# Patient Record
Sex: MALE | Race: WHITE | NOT HISPANIC OR LATINO | Employment: FULL TIME | URBAN - METROPOLITAN AREA
[De-identification: names, ages, dates, MRNs, and addresses within clinical notes are randomized per-mention and may not be internally consistent; named-entity substitution may affect disease eponyms.]

---

## 2017-01-10 ENCOUNTER — COMMUNICATION - HEALTHEAST (OUTPATIENT)
Dept: FAMILY MEDICINE | Facility: CLINIC | Age: 47
End: 2017-01-10

## 2017-01-10 ENCOUNTER — OFFICE VISIT - HEALTHEAST (OUTPATIENT)
Dept: FAMILY MEDICINE | Facility: CLINIC | Age: 47
End: 2017-01-10

## 2017-01-10 DIAGNOSIS — M25.519 SHOULDER PAIN: ICD-10-CM

## 2017-01-10 ASSESSMENT — MIFFLIN-ST. JEOR: SCORE: 1766.17

## 2017-11-09 ENCOUNTER — RECORDS - HEALTHEAST (OUTPATIENT)
Dept: ADMINISTRATIVE | Facility: OTHER | Age: 47
End: 2017-11-09

## 2017-11-09 ENCOUNTER — HOSPITAL ENCOUNTER (OUTPATIENT)
Dept: CT IMAGING | Facility: CLINIC | Age: 47
Discharge: HOME | End: 2017-11-09

## 2017-11-09 DIAGNOSIS — C62.90 TESTICULAR CANCER (H): ICD-10-CM

## 2018-11-23 ENCOUNTER — RECORDS - HEALTHEAST (OUTPATIENT)
Dept: ADMINISTRATIVE | Facility: OTHER | Age: 48
End: 2018-11-23

## 2021-05-30 VITALS — WEIGHT: 200.6 LBS | BODY MASS INDEX: 28.72 KG/M2 | HEIGHT: 70 IN

## 2021-06-05 ENCOUNTER — RECORDS - HEALTHEAST (OUTPATIENT)
Dept: SCHEDULING | Facility: CLINIC | Age: 51
End: 2021-06-05

## 2021-06-05 DIAGNOSIS — C62.90 MALIGNANT NEOPLASM OF OTHER AND UNSPECIFIED TESTIS: ICD-10-CM

## 2021-06-08 NOTE — PROGRESS NOTES
ASSESSMENT:  Shoulder pain [M25.519]    1. Shoulder pain  - predniSONE (DELTASONE) 10 MG tablet; 4 tabs a day for 3 days, 3 tabs a day for 3 days, 2 tabs a day for 3 days, 1 tab a day for 3 days, then stop.  Dispense: 30 tablet; Refill: 0    He seems to have an irritation in a nerve and most likely it probably is in nerve as it comes off of the spinal column.  He has lots of spasm and tight muscles on that left side over the area of the trapezius muscle and underneath the shoulder blade on that side.  I suspect that that is what is causing the irritation in the arm.  He does not show any evidence for any motor or strength issues.  He has one finger that is numb at this point but is able to move the finger other than the fact that it feels like it is a little swollen when he tries to move it.  There is no appreciable swelling at all in his arm even though he feels like the left elbow is warm it is not to exam today.  Again I suspect that this is probably an irritation to the nerve and I think he would benefit from prednisone.  He notes that he has prednisone at home because of having to take that after his chemotherapy and in regards to his testicular cancer.  We discussed using the prednisone he has at home I would like him to use 40 mg a day for 3 days and then 30 mg a day for 3 days then 20 mg a day for 3 days and then 10 mg a day for 3 days.  I am hoping that this will reduce the inflammation in that area and resolve some of this irritation and symptoms that he is having.  If he has additional concerns or questions will certainly let me know.  If he has any worsening of his symptoms he certainly should let us know as well.  He did call later in the day stating that he did not have prednisone at home and asking me to send a prescription to the pharmacy which was sent.  He denies that this is a work comp injury.      PLAN:  There are no Patient Instructions on file for this visit.    No orders of the defined types  "were placed in this encounter.    Medications Discontinued During This Encounter   Medication Reason     predniSONE (DELTASONE) 10 MG tablet Reorder       Return if symptoms worsen or fail to improve.    CHIEF COMPLAINT:  Chief Complaint   Patient presents with     Shoulder Pain     Check left shoulder/arm/hand pain, shooting pain worse with laying down and certain movements, no known injury DOI-12/21/16       HISTORY OF PRESENT ILLNESS:  Papa is a 46 y.o. male presenting to the clinic today with concerns of left shoulder pain. He is a patient of Dr. Marvin.    Shoulder pain: He started to experience pain in his left shoulder 3-4 weeks ago. He works for [employer], and injured his hip while at work, which caused back pain. He recalls lifting something heavy at work, and thinks this caused his left shoulder pain. He initially experienced a \"zing\" of pain and then it was gone. He did not experience more pain until a few days later when he experienced a sharp pain in his left elbow. He experiences pain throughout his left arm, and experiences numbness in his left index finger.  Initially he was having pain in his entire upper arm and down into his second and third finger on the left hand but over time the third finger has improved and now is just the second finger that is having numbness in.  He denies pain or numbness in any other finger of his left hand. He went to a chiropractor and has been having regular adjustments for the last 2 weeks. His chiropractor notices a lot of tension near his left shoulder blade.  It is thought that this was muscle spasms and that he had a bunch of knots in his back especially under his left shoulder blade.  He has noticed that he cannot lay on his left side, and this has interrupted his sleep. He has tried ice with some relief. During the day, his shoulder pain is improved with movement, but he experiences pain at night when he rests. He has good range of motion of his left arm and is " "not complaining of any problems with movement of the arm or any problems with his strength.. He currently describes his pain as  Feeling \"like an ice pick\" in his left elbow, and notes that his arm is warm to the touch. If he presses on his elbow, he is able to experience some relief.     He had testicular cancer in 2012- 2013. He has regular follow up appointments and has a CT scan scheduled. 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     REVIEW OF SYSTEMS:   Denies fever, chills, visual changes, fatigue, myalgias, nasal congestion, rhinorrhea, ear pain or discharge, sore throat, swollen glands, abdominal pain, nausea, vomiting, diarrhea, constipation, cough, shortness of breath, chest pain, weight change, change in bowel habits, melena, rectal bleeding, dysuria, frequency, urgency, hematuria, polyuria, polydipsia, polyphagia, swelling or erythema, edema, rash, weakness, paresthesias,  or mood changes.  Remainder of review of systems was negative.    PFSH:  Reviewed, as below.     Patient Active Problem List   Diagnosis     Testicular Seminoma     Lymphadenopathy       No Known Allergies    History reviewed. No pertinent past medical history.    Past Surgical History   Procedure Laterality Date     Inguinal hernia repair Bilateral 01/23/2012     Dr. Bililngs       History   Smoking Status     Never Smoker   Smokeless Tobacco     Not on file       TOBACCO USE:  History   Smoking Status     Never Smoker   Smokeless Tobacco     Not on file       VITALS:  Vitals:    01/10/17 1037   BP: 110/68   Patient Site: Right Arm   Patient Position: Sitting   Cuff Size: Adult Regular   Pulse: 82   Resp: 14   Temp: 97.8  F (36.6  C)   TempSrc: Oral   Weight: 200 lb 9.6 oz (91 kg)   Height: 5' 10\" (1.778 m)     Wt Readings from Last 3 Encounters:   01/10/17 200 lb 9.6 oz (91 kg)   12/03/15 204 lb (92.5 kg)   11/30/15 203 lb (92.1 kg)     Body mass index is 28.78 kg/(m^2).    PHYSICAL EXAM:  GENERAL " APPEARANCE: A&A, NAD, well hydrated, well nourished  SKIN:  Normal skin turgor, no lesions/rashes   CV: RRR, no M/G/R   LUNGS: CTAB  EXTREMITY: no edema. No tenderness to palpation of the left arm.  There is no warmth to the touch over his elbow although he states that it feels warm all the time.  There is absolutely no swelling of his elbow or his arm.  Full range of motion of his arm is appreciated.  Normal strength in that left arm is appreciated.  Spasms noted over trapezius muscle at the base of the neck. Full range of motion of the left shoulder. Strength is 5/5. Normal grasp strength. Full range of motion of the neck.   NEURO: no gross deficits.  Normal sensation in entire hand, other than left index finger, which was markedly decreased. He was able to feel me touching, but distinctly less than all of his other fingers.   PSYCHIATRIC;  Mood appropriate, memory intact      ADDITIONAL HISTORY SUMMARIZED (2): Further history obtained from Dr. Leslie regarding shoulder pain and index finger numbness.  DECISION TO OBTAIN EXTRA INFORMATION (1): Requested second opinion from Dr. Wells.   RADIOLOGY TESTS (1): None.  LABS (1): None.  MEDICINE TESTS (1): None.  INDEPENDENT REVIEW (2 each): None.     The visit lasted a total of 13 minutes face to face with the patient. Over 50% of the time was spent counseling and educating the patient about shoulder and left arm pain and numbness    I, Kelsie Aguirre, am scribing for and in the presence of, Dr. Santamaria.    I, Dr. Santamaria, personally performed the services described in this documentation, as scribed by Kelsie Aguirre in my presence, and it is both accurate and complete.    MEDICATIONS:  Current Outpatient Prescriptions   Medication Sig Dispense Refill     predniSONE (DELTASONE) 10 MG tablet 4 tabs a day for 3 days, 3 tabs a day for 3 days, 2 tabs a day for 3 days, 1 tab a day for 3 days, then stop. 30 tablet 0     No current facility-administered  medications for this visit.        Total data points: 3

## 2022-01-26 ENCOUNTER — OFFICE VISIT (OUTPATIENT)
Dept: FAMILY MEDICINE | Facility: CLINIC | Age: 52
End: 2022-01-26
Payer: COMMERCIAL

## 2022-01-26 VITALS
TEMPERATURE: 98.4 F | OXYGEN SATURATION: 97 % | WEIGHT: 208 LBS | HEIGHT: 70 IN | SYSTOLIC BLOOD PRESSURE: 138 MMHG | HEART RATE: 76 BPM | BODY MASS INDEX: 29.78 KG/M2 | DIASTOLIC BLOOD PRESSURE: 98 MMHG

## 2022-01-26 DIAGNOSIS — K21.9 GASTROESOPHAGEAL REFLUX DISEASE WITHOUT ESOPHAGITIS: ICD-10-CM

## 2022-01-26 DIAGNOSIS — Z13.220 SCREENING FOR HYPERLIPIDEMIA: ICD-10-CM

## 2022-01-26 DIAGNOSIS — Z00.00 ANNUAL PHYSICAL EXAM: Primary | ICD-10-CM

## 2022-01-26 DIAGNOSIS — I10 BENIGN ESSENTIAL HYPERTENSION: ICD-10-CM

## 2022-01-26 DIAGNOSIS — Z13.1 SCREENING FOR DIABETES MELLITUS: ICD-10-CM

## 2022-01-26 DIAGNOSIS — Z12.5 SCREENING FOR PROSTATE CANCER: ICD-10-CM

## 2022-01-26 PROBLEM — C62.90: Status: ACTIVE | Noted: 2022-01-26

## 2022-01-26 PROBLEM — Z85.47 HISTORY OF TESTICULAR CANCER: Status: ACTIVE | Noted: 2022-01-26

## 2022-01-26 LAB
ANION GAP SERPL CALCULATED.3IONS-SCNC: 12 MMOL/L (ref 5–18)
BUN SERPL-MCNC: 10 MG/DL (ref 8–22)
CALCIUM SERPL-MCNC: 9.4 MG/DL (ref 8.5–10.5)
CHLORIDE BLD-SCNC: 101 MMOL/L (ref 98–107)
CHOLEST SERPL-MCNC: 199 MG/DL
CO2 SERPL-SCNC: 27 MMOL/L (ref 22–31)
CREAT SERPL-MCNC: 1 MG/DL (ref 0.7–1.3)
FASTING STATUS PATIENT QL REPORTED: YES
GFR SERPL CREATININE-BSD FRML MDRD: 90 ML/MIN/1.73M2
GLUCOSE BLD-MCNC: 96 MG/DL (ref 70–125)
HDLC SERPL-MCNC: 63 MG/DL
LDLC SERPL CALC-MCNC: 118 MG/DL
POTASSIUM BLD-SCNC: 4.2 MMOL/L (ref 3.5–5)
PSA SERPL-MCNC: 0.38 UG/L (ref 0–3.5)
SODIUM SERPL-SCNC: 140 MMOL/L (ref 136–145)
TRIGL SERPL-MCNC: 89 MG/DL

## 2022-01-26 PROCEDURE — 80061 LIPID PANEL: CPT | Performed by: NURSE PRACTITIONER

## 2022-01-26 PROCEDURE — 99386 PREV VISIT NEW AGE 40-64: CPT | Performed by: NURSE PRACTITIONER

## 2022-01-26 PROCEDURE — G0103 PSA SCREENING: HCPCS | Performed by: NURSE PRACTITIONER

## 2022-01-26 PROCEDURE — 80048 BASIC METABOLIC PNL TOTAL CA: CPT | Performed by: NURSE PRACTITIONER

## 2022-01-26 PROCEDURE — 36415 COLL VENOUS BLD VENIPUNCTURE: CPT | Performed by: NURSE PRACTITIONER

## 2022-01-26 ASSESSMENT — MIFFLIN-ST. JEOR: SCORE: 1794.73

## 2022-01-26 NOTE — PROGRESS NOTES
SUBJECTIVE:   CC: Papa Robertson is an 51 year old male who presents for preventative health visit.     Doing well.  Struggles with a persistent sensation like he has to clear his throat.  No dysphagia.  Occasional nausea with upset stomach.  Occasional heartburn. No purulent sputum.    Blood pressure is elevated.  Has been elevated at home.  Asymptomatic.  He has plans to start exercising again    Patient has been advised of split billing requirements and indicates understanding: Yes  Healthy Habits:     Getting at least 3 servings of Calcium per day:  NO    Bi-annual eye exam:  NO    Dental care twice a year:  Yes    Sleep apnea or symptoms of sleep apnea:  Excessive snoring    Diet:  Regular (no restrictions)    Frequency of exercise:  None    Taking medications regularly:  Yes    Medication side effects:  Not applicable    PHQ-2 Total Score: 0    Additional concerns today:  No    Today's PHQ-2 Score:   PHQ-2 ( 1999 Pf[employer]) 1/25/2022   Q1: Little interest or pleasure in doing things 0   Q2: Feeling down, depressed or hopeless 0   PHQ-2 Score 0   Q1: Little interest or pleasure in doing things Not at all   Q2: Feeling down, depressed or hopeless Not at all   PHQ-2 Score 0       Abuse: Current or Past(Physical, Sexual or Emotional)- No  Do you feel safe in your environment? Yes    Have you ever done Advance Care Planning? (For example, a Health Directive, POLST, or a discussion with a medical provider or your loved ones about your wishes): Yes, advance care planning is on file.    Social History     Tobacco Use     Smoking status: Never Smoker     Smokeless tobacco: Never Used   Substance Use Topics     Alcohol use: Yes     Comment: rarely     If you drink alcohol do you typically have >3 drinks per day or >7 drinks per week? No    Alcohol Use 1/25/2022   Prescreen: >3 drinks/day or >7 drinks/week? No       Last PSA:   Prostate Specific Antigen Screen   Date Value Ref Range Status   01/20/2012 0.7 <2.6 ng/mL Final     " Comment:     Method is Abbott Prostate-Specific Antigen (PSA)            Standard-WHO 1st International (90:10) as of 09/26/05         Reviewed orders with patient. Reviewed health maintenance and updated orders accordingly - Yes  Lab work is in process    Reviewed and updated as needed this visit by clinical staff  Tobacco  Allergies  Meds             Reviewed and updated as needed this visit by Provider               No past medical history on file.   Past Surgical History:   Procedure Laterality Date     AS LAP,ORCHIECTOMY Left      OTHER SURGICAL HISTORY Bilateral 01/23/2012    inguinal hernia repairDrPrakash Billings       Review of Systems  CONSTITUTIONAL: NEGATIVE for fever, chills, change in weight  INTEGUMENTARY/SKIN: NEGATIVE for worrisome rashes, moles or lesions  EYES: NEGATIVE for vision changes or irritation  ENT: NEGATIVE for ear, mouth and throat problems  RESP: NEGATIVE for significant cough or SOB  CV: NEGATIVE for chest pain, palpitations or peripheral edema  GI: NEGATIVE for nausea, abdominal pain, heartburn, or change in bowel habits   male: negative for dysuria, hematuria, decreased urinary stream, erectile dysfunction, urethral discharge  MUSCULOSKELETAL: NEGATIVE for significant arthralgias or myalgia  NEURO: NEGATIVE for weakness, dizziness or paresthesias  PSYCHIATRIC: NEGATIVE for changes in mood or affect    OBJECTIVE:   BP (!) 138/98   Pulse 76   Temp 98.4  F (36.9  C)   Ht 1.778 m (5' 10\")   Wt 94.3 kg (208 lb)   SpO2 97%   BMI 29.84 kg/m      Physical Exam  GENERAL: healthy, alert and no distress  EYES: Eyes grossly normal to inspection, PERRL and conjunctivae and sclerae normal  HENT: ear canals and TM's normal, nose and mouth without ulcers or lesions  NECK: no adenopathy, no asymmetry, masses, or scars and thyroid normal to palpation  RESP: lungs clear to auscultation - no rales, rhonchi or wheezes  CV: regular rate and rhythm, normal S1 S2, no S3 or S4, no murmur, click or " "rub, no peripheral edema and peripheral pulses strong  ABDOMEN: soft, nontender, no hepatosplenomegaly, no masses and bowel sounds normal  MS: no gross musculoskeletal defects noted, no edema  SKIN: no suspicious lesions or rashes  NEURO: Normal strength and tone, mentation intact and speech normal  PSYCH: mentation appears normal, affect normal/bright    Diagnostic Test Results:  Labs reviewed in Epic    ASSESSMENT/PLAN:       ICD-10-CM    1. Annual physical exam  Z00.00    2. Screening for hyperlipidemia  Z13.220 Lipid panel reflex to direct LDL Fasting     Lipid panel reflex to direct LDL Fasting   3. Screening for diabetes mellitus  Z13.1 CANCELED: Glucose   4. Screening for prostate cancer  Z12.5 PSA, screen     PSA, screen   5. Gastroesophageal reflux disease without esophagitis  K21.9 omeprazole (PRILOSEC) 20 MG DR capsule   6. Benign essential hypertension  I10 Basic metabolic panel     Basic metabolic panel     Screening labs ordered.  Patient has had elevated blood pressure for a little while now.  Discussed treatment options.  He would like to work on lifestyle first.  Reviewed-diet and exercise.  Suspect throat clearing may be reflux symptoms.  Trial of omeprazole.  Reviewed healthcare directives.  Reviewed vaccinations    Patient has been advised of split billing requirements and indicates understanding: Yes    COUNSELING:   Reviewed preventive health counseling, as reflected in patient instructions    Estimated body mass index is 29.84 kg/m  as calculated from the following:    Height as of this encounter: 1.778 m (5' 10\").    Weight as of this encounter: 94.3 kg (208 lb).     Weight management plan: Discussed healthy diet and exercise guidelines    He reports that he has never smoked. He has never used smokeless tobacco.      Counseling Resources:  ATP IV Guidelines  Pooled Cohorts Equation Calculator  FRAX Risk Assessment  ICSI Preventive Guidelines  Dietary Guidelines for Americans, 2010  USDA's " MyPlate  ASA Prophylaxis  Lung CA Screening    Abdi Dennis NP  [facility]

## 2022-01-26 NOTE — PATIENT INSTRUCTIONS
"Let's try omeprazole to see if it helps with the throat clearing.    Let me know via message in a month if symptoms persist and we can look into meeting with ENT.    Screening labs done today.    You've got 6 months to get the blood pressure under control. Consider the DASH diet and getting regular vigorous exercise.  If blood pressure is still elevated in 6 months, then we should consider starting medication    That shingles vaccine we talked about is called \"Shingrix\". Check with your insurance to see if they cover it. If they do and you're interested in getting it, let me know and we can have you come in for just the shot without having to see me.        Preventive Health Recommendations  Male Ages 50 - 64    Yearly exam:             See your health care provider every year in order to  o   Review health changes.   o   Discuss preventive care.    o   Review your medicines if your doctor has prescribed any.     Have a cholesterol test every 5 years, or more frequently if you are at risk for high cholesterol/heart disease.     Have a diabetes test (fasting glucose) every three years. If you are at risk for diabetes, you should have this test more often.     Have a colonoscopy at age 50, or have a yearly FIT test (stool test). These exams will check for colon cancer.      Talk with your health care provider about whether or not a prostate cancer screening test (PSA) is right for you.    You should be tested each year for STDs (sexually transmitted diseases), if you re at risk.     Shots: Get a flu shot each year. Get a tetanus shot every 10 years.     Nutrition:    Eat at least 5 servings of fruits and vegetables daily.     Eat whole-grain bread, whole-wheat pasta and brown rice instead of white grains and rice.     Get adequate Calcium and Vitamin D.     Lifestyle    Exercise for at least 150 minutes a week (30 minutes a day, 5 days a week). This will help you control your weight and prevent disease.     Limit " alcohol to one drink per day.     No smoking.     Wear sunscreen to prevent skin cancer.     See your dentist every six months for an exam and cleaning.     See your eye doctor every 1 to 2 years.        Dietary Approaches to Stop Hypertension (The DASH Diet)   What is hypertension?   Hypertension is the term for blood pressure that is consistently higher than normal. Blood pressure is the force of blood against artery walls. Blood pressure can be unhealthy if it is above 120/80. The higher your blood pressure, the greater the health risk.     High blood pressure can be controlled if you take these steps:   Maintain a healthy weight.   Be physically active.   Follow a healthy eating plan, which includes foods lower in salt and sodium.   If you drink alcoholic beverages, do so in moderation.   As noted in this list, diet affects high blood pressure. Following the DASH diet and reducing the amount of sodium in your diet will help lower your blood pressure. It will also help prevent high blood pressure.     What is the DASH diet?   Dietary Approaches to Stop Hypertension (DASH) is a diet that is low in saturated fat, cholesterol, and total fat. It emphasizes fruits, vegetables, and low-fat dairy foods. The DASH diet also includes whole-grain products, fish, poultry, and nuts. It encourages fewer servings of red meat, sweets, and sugar-containing beverages. It is rich in magnesium, potassium, and calcium, as well as protein and fiber.     How do I get started on the DASH diet?   The DASH diet requires no special foods and has no hard-to-follow recipes. Start by seeing how DASH compares with your current eating habits.  The DASH eating plan shown is based on 2,000 calories a day. Your health care provider or a dietitian can help you determine how many calories a day you need. Most adults need somewhere between 1600 and 2800 calories a day. Serving sizes will vary between 1/2 cup and 1 1/4 cups.     Check the product's  nutrition label to determine serving sizes of particular products.    Food Group   Number of Servings   Examples of Serving Size    Grains and grain products   7 to 8   1 slice of bread    1 cup ready-to-eat cold cereal    1/2 cup cooked rice, pasta, or   cereal    Vegetables   4 to 5   1 cup raw leafy vegetable    1/2 cup cooked vegetable    6 oz. vegetable juice    Fruits   4 to 5   1 medium fruit       1/4 cup dried fruit    1/2 cup fresh, frozen, or canned fruit    6 oz fruit juice    Low-fat or fat-free dairy foods   2 to 3   8 oz milk    1 cup yogurt    1 1/2 oz cheese    Lean meats, poultry, or fish   2 or fewer   3 oz cooked lean meat, skinless poultry, or fish    Nuts, seeds, and dry beans   4 to 5 per week   1/3 cup or 1 1/2 oz nuts    1 tablespoon or 1/2 oz seeds    1/2 cup cooked dry beans     Fats and oils   2 to 3   1 teaspoon soft margarine    1 tablespoon low-fat mayonnaise    2 tablespoons light salad dressing    1 teaspoon vegetable oil   Sweets   5 per week   1 tablespoon sugar              8 oz lemonade    1 tablespoon jelly or jam     1/2 oz jelly beans     Make changes gradually. Here are some suggestions that might help:     If you now eat 1 or 2 servings of vegetables a day, add a serving at lunch and another at dinner.   If you don't eat fruit now or have only juice at breakfast, add a serving to your meals or have it as a snack.   Drink milk or water with lunch or dinner instead of soda, sugar-sweetened tea, or alcohol. Choose low-fat (1%) or fat-free (skim) dairy products to reduce how much saturated fat, total fat, cholesterol, and calories you eat. If you have trouble digesting dairy products, try taking lactase enzyme pills or drops (available at drugstores and groceries) with the dairy foods. Or buy lactose-free milk or milk with lactase enzyme added to it.   Read food labels on margarines and salad dressings to choose products lowest in fat.   If you now eat large portions of meat,  cut back gradually--by a half or a third at each meal. Limit meat to 6 ounces a day (2 servings). Three to four ounces is about the size of a deck of cards.   Have 2 or more vegetarian-style (meatless) meals each week. Increase servings of vegetables, rice, pasta, and beans in all meals. Try casseroles and pasta, and stir-gaytan dishes, which have less meat and more vegetables, grains, and beans.   Use fruits canned in their own juice. Fresh fruits require little or no preparation. Dried fruits are a good choice to carry with you or to have ready in the car.   Try these snacks ideas: unsalted pretzels or nuts mixed with raisins, stan crackers, low-fat and fat-free yogurt and frozen yogurt, popcorn with no salt or butter added, and raw vegetables.   Choose whole grain foods to get more nutrients, including minerals and fiber. For example, choose whole-wheat bread or whole-grain cereals.   Use fresh, frozen, or no-salt-added canned vegetables.     Remember to also reduce the salt and sodium in your diet. Try to have no more than 2000 milligrams (mg) of sodium per day, with a goal of further reducing the sodium to 1500 mg per day. Three important ways to reduce sodium are:     Use reduced-sodium or no-salt-added food products.   Use less salt when you prepare foods and do not add salt to your food at the table.   Read fool labels. Aim for foods that are less than 5 percent of the daily value of sodium.     The DASH eating plan was not designed for weight loss. But it contains many lower calorie foods, such as fruits and vegetables. You can make it lower in calories by replacing higher calorie foods with more fruits and vegetables. Some ideas to increase fruits and vegetables and decrease calories include:     Eat a medium apple instead of four shortbread cookies. You'll save 80 calories.   Eat 1/4 cup of dried apricots instead of a 2-ounce bag of pork rinds. You'll save 230 calories.   Have a hamburger that's 3 ounces  instead of 6 ounces. Add a 1/2 cup serving of carrots and a 1/2 cup serving of spinach. You'll save more than 200 calories.   Instead of 5 ounces of chicken, have a stir gaytan with 2 ounces of chicken and 1 and 1/2 cups of raw vegetables. Use a small amount of vegetable oil. You'll save 50 calories.   Have a 1/2 cup serving of low-fat frozen yogurt instead of a 1 and 1/2 ounce milk chocolate bar. You'll save about 110 calories.   Use low-fat or fat-free condiments, such as fat free salad dressings.   Eat smaller portions--cut back gradually.   Use food labels to compare fat content in packaged foods. Items marked low-fat or fat-free may be lower in fat without being lower in calories than their regular versions.   Limit foods with lots of added sugar, such as pies, flavored yogurts, candy bars, ice cream, sherbet, regular soft drinks, and fruit drinks.   Drink water or club soda instead of cola or other soda drinks.     Based on National Institutes of Health Guidelines. Published by [employer].   Copyright   2004 [employer] and/or one of its subsidiaries. All Rights Reserved.

## 2022-02-13 ENCOUNTER — HEALTH MAINTENANCE LETTER (OUTPATIENT)
Age: 52
End: 2022-02-13

## 2022-06-23 ENCOUNTER — ANCILLARY PROCEDURE (OUTPATIENT)
Dept: GENERAL RADIOLOGY | Facility: CLINIC | Age: 52
End: 2022-06-23
Attending: NURSE PRACTITIONER
Payer: COMMERCIAL

## 2022-06-23 ENCOUNTER — OFFICE VISIT (OUTPATIENT)
Dept: FAMILY MEDICINE | Facility: CLINIC | Age: 52
End: 2022-06-23

## 2022-06-23 VITALS
SYSTOLIC BLOOD PRESSURE: 130 MMHG | WEIGHT: 214.2 LBS | DIASTOLIC BLOOD PRESSURE: 92 MMHG | HEART RATE: 79 BPM | OXYGEN SATURATION: 96 % | BODY MASS INDEX: 30.73 KG/M2

## 2022-06-23 DIAGNOSIS — R07.0 THROAT DISCOMFORT: Primary | ICD-10-CM

## 2022-06-23 DIAGNOSIS — R05.3 CHRONIC COUGH: ICD-10-CM

## 2022-06-23 DIAGNOSIS — Z85.47 HISTORY OF TESTICULAR CANCER: ICD-10-CM

## 2022-06-23 PROCEDURE — 71046 X-RAY EXAM CHEST 2 VIEWS: CPT | Mod: TC | Performed by: RADIOLOGY

## 2022-06-23 PROCEDURE — 99213 OFFICE O/P EST LOW 20 MIN: CPT | Performed by: NURSE PRACTITIONER

## 2022-06-23 ASSESSMENT — PAIN SCALES - GENERAL: PAINLEVEL: NO PAIN (0)

## 2022-06-23 NOTE — PROGRESS NOTES
Assessment & Plan     ICD-10-CM    1. Throat discomfort  R07.0 Adult ENT  Referral   2. Chronic cough  R05.3 XR Chest 2 Views     CANCELED: XR Chest 2 Views   3. History of testicular cancer  Z85.47 Adult Cardiology Eval  Referral     Given persistent cough, chest x-ray ordered.  Negative.  He has tried antihistamines and nasal sprays without any benefit.  ENT referral placed for constant throat sensation for further evaluation.  Requested cardiology referral placed as well.    Subjective     HPI     Cough  Omeprazole didn't help.  He has a constant clearing of his throat with postnasal drip and feeling of issues in his sinuses.  Try gets that no hemoptysis.  No chest pain.  He reports that given his history of testicular cancer treatment he was told to establish care with a cardiologist several years afterwards which he would like to do now.    Review of Systems - negative except for what's listed in the HPI      Objective    BP (!) 130/92   Pulse 79   Wt 97.2 kg (214 lb 3.2 oz)   SpO2 96%   BMI 30.73 kg/m    Physical Exam   General appearance - alert, well appearing, and in no distress  Mental status - alert, oriented to person, place, and time  Eyes -PERRLA  Ears - bilateral TM's and external ear canals normal  Nose -patent.  Mild amounts of discharge.  No tenderness along the maxillary sinuses.  Mouth - mucous membranes moist. No oral lesions.  Neck - no significant adenopathy  Lymphatics - no palpable lymphadenopathy  Chest - clear to auscultation, no wheezes, rales or rhonchi, symmetric air entry  Heart - normal rate and regular rhythm, S1 and S2 normal, no murmurs noted  Extremities - no peripheral edema  Skin - normal coloration and turgor.    Abdi Dennis, CNP    This note has been dictated using voice recognition software. Any grammatical or context distortions are unintentional and inherent to the software.

## 2022-06-23 NOTE — PATIENT INSTRUCTIONS
Chest x-ray today    ENT referral placed.  Contact information provided.    Same with the cardiology referral that you requested.  I suspect they will probably want to do an echocardiogram on you, but in order to avoid unnecessary testing I will let them make that call.

## 2022-07-28 ENCOUNTER — OFFICE VISIT (OUTPATIENT)
Dept: OTOLARYNGOLOGY | Facility: CLINIC | Age: 52
End: 2022-07-28
Attending: NURSE PRACTITIONER
Payer: COMMERCIAL

## 2022-07-28 DIAGNOSIS — R04.0 EPISTAXIS: ICD-10-CM

## 2022-07-28 DIAGNOSIS — K21.9 LARYNGOPHARYNGEAL REFLUX (LPR): ICD-10-CM

## 2022-07-28 DIAGNOSIS — R09.A2 GLOBUS SENSATION: Primary | ICD-10-CM

## 2022-07-28 PROCEDURE — 30901 CONTROL OF NOSEBLEED: CPT | Mod: RT | Performed by: OTOLARYNGOLOGY

## 2022-07-28 PROCEDURE — 31575 DIAGNOSTIC LARYNGOSCOPY: CPT | Performed by: OTOLARYNGOLOGY

## 2022-07-28 PROCEDURE — 99243 OFF/OP CNSLTJ NEW/EST LOW 30: CPT | Mod: 25 | Performed by: OTOLARYNGOLOGY

## 2022-07-28 NOTE — PROGRESS NOTES
HPI: This patient is a 51yo M who presents for evaluation of the throat at the request of Abdi Dennis NP. There has been a globus sensation for a few years, accompanied with throat clearing and occasional dry cough. Denies fevers, otalgia, weight loss, odynophagia, dysphagia, hemoptysis, and shortness of breath. Does not complain of sour taste, heartburn, or burping. Did try taking reflux medication for about 6 weeks without improvement. Also has epistaxis issues rather often.    Past medical history, surgical history, social history, family history, medications, and allergies have been reviewed with the patient and are documented above.    Review of Systems: a 10-system review was performed. Pertinent positives are noted in the HPI and on a separate scanned document in the chart.    PHYSICAL EXAMINATION:  GEN: no acute distress, normocephalic  EYES: extraocular movements are intact, pupils are equal and round. Sclera clear.   EARS: auricles are normally formed. The external auditory canals are clear with minimal to no cerumen. Tympanic membranes are intact bilaterally with no signs of infection, effusion, retractions, or perforations.  NOSE: anterior nares are patent. There are no masses or lesions. The septum is non-obstructing but deviated to the left. Anterior septum is dry bilaterally. Right side cauterized in two spots.  OC/OP: clear, dentition is in good repair. The tongue and palate are fully mobile and symmetric. No masses or lesions. Cobblestoning of the posterior pharyngeal wall.  HP/L (scope): nasopharynx, base of tongue, vallecula, epiglottis, and pyriform sinuses are clear. The bilateral vocal folds are mobile and without lesion. There is minimal interarytenoid pachydermia and some hyperemia of the laryngeal surface of the epiglottis c/w LPR.  NECK: soft and supple. No lymphadenopathy or masses. Airway is midline.  NEURO: CN VII and XII symmetric. alert and oriented. No spontaneous nystagmus. Gait is  normal.  PULM: breathing comfortably on room air, normal chest expansion with respiration  CARDS: no cyanosis or clubbing, normal carotid pulses    FLEXIBLE LARYNGOSCOPY: Scope inserted bilaterally to examine nasal tissue, nasopharynx, posterior oropharynx, hypopharynx, and larynx. See exam notes for exam finding details. Patient tolerated the procedure well.    PROCEDURE: the nasal tissue was prepped with topical neosynephrine and lidocaine spray. Silver nitrate was used to perform the nasal cautery without difficulty. The patient tolerated the procedure well.    MEDICAL DECISION-MAKING: This patient is a 51yo M with chronic laryngitis/globus sensation most likely from acid reflux in absence of other findings. Will refer to GI for further management moving forward. Also has epistaxis from the anterior septum. The area in question was cauterized today in clinic without difficulty. Advised on nasal saline use. RTC PRN.

## 2022-07-28 NOTE — LETTER
7/28/2022         RE: Papa Robertson  [address] [facility] [address]        Dear Colleague,    Thank you for referring your patient, Papa Robertson, to the [facility]. Please see a copy of my visit note below.    HPI: This patient is a 53yo M who presents for evaluation of the throat at the request of Abdi Dennis NP. There has been a globus sensation for a few years, accompanied with throat clearing and occasional dry cough. Denies fevers, otalgia, weight loss, odynophagia, dysphagia, hemoptysis, and shortness of breath. Does not complain of sour taste, heartburn, or burping. Did try taking reflux medication for about 6 weeks without improvement. Also has epistaxis issues rather often.    Past medical history, surgical history, social history, family history, medications, and allergies have been reviewed with the patient and are documented above.    Review of Systems: a 10-system review was performed. Pertinent positives are noted in the HPI and on a separate scanned document in the chart.    PHYSICAL EXAMINATION:  GEN: no acute distress, normocephalic  EYES: extraocular movements are intact, pupils are equal and round. Sclera clear.   EARS: auricles are normally formed. The external auditory canals are clear with minimal to no cerumen. Tympanic membranes are intact bilaterally with no signs of infection, effusion, retractions, or perforations.  NOSE: anterior nares are patent. There are no masses or lesions. The septum is non-obstructing but deviated to the left. Anterior septum is dry bilaterally. Right side cauterized in two spots.  OC/OP: clear, dentition is in good repair. The tongue and palate are fully mobile and symmetric. No masses or lesions. Cobblestoning of the posterior pharyngeal wall.  HP/L (scope): nasopharynx, base of tongue, vallecula, epiglottis, and pyriform sinuses are clear. The bilateral vocal folds are mobile and without lesion. There is minimal  interarytenoid pachydermia and some hyperemia of the laryngeal surface of the epiglottis c/w LPR.  NECK: soft and supple. No lymphadenopathy or masses. Airway is midline.  NEURO: CN VII and XII symmetric. alert and oriented. No spontaneous nystagmus. Gait is normal.  PULM: breathing comfortably on room air, normal chest expansion with respiration  CARDS: no cyanosis or clubbing, normal carotid pulses    FLEXIBLE LARYNGOSCOPY: Scope inserted bilaterally to examine nasal tissue, nasopharynx, posterior oropharynx, hypopharynx, and larynx. See exam notes for exam finding details. Patient tolerated the procedure well.    PROCEDURE: the nasal tissue was prepped with topical neosynephrine and lidocaine spray. Silver nitrate was used to perform the nasal cautery without difficulty. The patient tolerated the procedure well.    MEDICAL DECISION-MAKING: This patient is a 53yo M with chronic laryngitis/globus sensation most likely from acid reflux in absence of other findings. Will refer to GI for further management moving forward. Also has epistaxis from the anterior septum. The area in question was cauterized today in clinic without difficulty. Advised on nasal saline use. RTC PRN.        Again, thank you for allowing me to participate in the care of your patient.        Sincerely,        Kathy Deras MD

## 2022-08-02 ENCOUNTER — TELEPHONE (OUTPATIENT)
Dept: GASTROENTEROLOGY | Facility: CLINIC | Age: 52
End: 2022-08-02

## 2022-08-02 NOTE — TELEPHONE ENCOUNTER
[facility] Call Center    Phone Message    May a detailed message be left on voicemail: Yes    Reason for Call: Other: Patient is currently scheduled on 2/62022, as a patient New GI Urgent. This is outside the expected timeline for this schedule. Paitent has been added to the waitlist.      Action Taken: Message routed to:  Other: GI REFERRAL TRIAGE POOL     Travel Screening: Not Applicable

## 2022-08-04 NOTE — TELEPHONE ENCOUNTER
LM to return call. Patient has appointment with Dr. Jett in Gastro on 2/6/2023 at 10:00 AM. Please offer and assist sooner appointment if patient would like to be seen sooner. Okay to use return appointment if patient calls back in the next 30 days from today.   Lina Burton cma.....8/4/2022 at 1:24 PM

## 2022-08-10 NOTE — TELEPHONE ENCOUNTER
LM to return call. Patient has appointment with Dr. Jett in Gastro on 2/6/2023 at 10:00 AM. Please offer and assist sooner appointment if patient would like to be seen sooner. Okay to use return appointment if patient calls back in the next 30 days from today.   Lina Burton cma.....8/10/2022 at 9:21 AM

## 2022-08-15 ENCOUNTER — OFFICE VISIT (OUTPATIENT)
Dept: GASTROENTEROLOGY | Facility: CLINIC | Age: 52
End: 2022-08-15
Attending: OTOLARYNGOLOGY
Payer: COMMERCIAL

## 2022-08-15 VITALS
HEIGHT: 70 IN | DIASTOLIC BLOOD PRESSURE: 88 MMHG | BODY MASS INDEX: 30.91 KG/M2 | WEIGHT: 215.9 LBS | SYSTOLIC BLOOD PRESSURE: 136 MMHG

## 2022-08-15 DIAGNOSIS — R09.89 CHRONIC THROAT CLEARING: Primary | ICD-10-CM

## 2022-08-15 DIAGNOSIS — K21.9 LARYNGOPHARYNGEAL REFLUX (LPR): ICD-10-CM

## 2022-08-15 PROCEDURE — 99213 OFFICE O/P EST LOW 20 MIN: CPT | Performed by: INTERNAL MEDICINE

## 2022-08-15 ASSESSMENT — PAIN SCALES - GENERAL: PAINLEVEL: NO PAIN (0)

## 2022-08-15 NOTE — PATIENT INSTRUCTIONS
As we had discussed    1.  We will arrange upper endoscopy with Bravo pH study off medications to measure acid levels in the lower esophagus    2.  Discussed.  Other treatments if needed in the future defer to ears nose and throat team.    As needed return

## 2022-08-15 NOTE — LETTER
8/15/2022         RE: Papa Robertson  [address] [address]        Dear Colleague,    Thank you for referring your patient, Papa Robertson, to the [facility]. Please see a copy of my visit note below.    Gastroenterology    52-year-old male to review throat clearing for roughly 8 to 12 months.  He was placed on omeprazole for 6 to 7 weeks without clear benefit.  He has had a sensation perhaps a object is in the region of his throat there was previous concern of a cough but that has resolved.  No symptoms of classic heartburn.  Prior nasal spray without benefit.    ENT review July 28 hyperemia of the epiglottis and inferior arytenoid pachydermia.    No alarm features.  No dysphagia no history of ulcers.  No weight loss.  Occasional ibuprofen.    Previous colonoscopy December 2015 advised 10-year follow-up.  No upper endoscopy    Past medical history: Testicular cancer, lymphadenopathy    Medications reviewed on epic    Allergies reviewed on epic    Social: Works for [employer]    Family history negative.  Maternal grand father colorectal carcinoma age 61.    Review of systems negative otherwise noncontributory    No acute distress.  Blood pressure 136/88, pulse 79, afebrile, BMI 30.98, head and neck unremarkable cardiac S1-S2 without murmur, lungs clear throughout, abdomen soft nontender without organomegaly, no lower extremity edema, skin without rash.    Impression: No evidence of heartburn.  Throat clearing globus sensation.  There elements of a controversy with LPR.  Proximal acid levels are unknown.    Plan: 1.  Upper endoscopy with Bravo pH monitor off medications for diagnosis.  2.  Further recommendations to follow.  Patient is interested in symptom relief and a resolution.  If acid levels are normal consider ENT for medical management.    Follow-up to be determined          Again, thank you for allowing me to participate in the care of your  patient.        Sincerely,        Jermain Jett MD

## 2022-08-15 NOTE — PROGRESS NOTES
Gastroenterology    52-year-old male to review throat clearing for roughly 8 to 12 months.  He was placed on omeprazole for 6 to 7 weeks without clear benefit.  He has had a sensation perhaps a object is in the region of his throat there was previous concern of a cough but that has resolved.  No symptoms of classic heartburn.  Prior nasal spray without benefit.    ENT review July 28 hyperemia of the epiglottis and inferior arytenoid pachydermia.    No alarm features.  No dysphagia no history of ulcers.  No weight loss.  Occasional ibuprofen.    Previous colonoscopy December 2015 advised 10-year follow-up.  No upper endoscopy    Past medical history: Testicular cancer, lymphadenopathy    Medications reviewed on epic    Allergies reviewed on epic    Social: Works for [employer] [place]    Family history negative.  Maternal grand father colorectal carcinoma age 61.    Review of systems negative otherwise noncontributory    No acute distress.  Blood pressure 136/88, pulse 79, afebrile, BMI 30.98, head and neck unremarkable cardiac S1-S2 without murmur, lungs clear throughout, abdomen soft nontender without organomegaly, no lower extremity edema, skin without rash.    Impression: No evidence of heartburn.  Throat clearing globus sensation.  There elements of a controversy with LPR.  Proximal acid levels are unknown.    Plan: 1.  Upper endoscopy with Bravo pH monitor off medications for diagnosis.  2.  Further recommendations to follow.  Patient is interested in symptom relief and a resolution.  If acid levels are normal consider ENT for medical management.    Follow-up to be determined

## 2022-10-16 ENCOUNTER — HEALTH MAINTENANCE LETTER (OUTPATIENT)
Age: 52
End: 2022-10-16

## 2022-11-29 ASSESSMENT — ANXIETY QUESTIONNAIRES
7. FEELING AFRAID AS IF SOMETHING AWFUL MIGHT HAPPEN: NOT AT ALL
GAD7 TOTAL SCORE: 2
8. IF YOU CHECKED OFF ANY PROBLEMS, HOW DIFFICULT HAVE THESE MADE IT FOR YOU TO DO YOUR WORK, TAKE CARE OF THINGS AT HOME, OR GET ALONG WITH OTHER PEOPLE?: NOT DIFFICULT AT ALL
2. NOT BEING ABLE TO STOP OR CONTROL WORRYING: NOT AT ALL
IF YOU CHECKED OFF ANY PROBLEMS ON THIS QUESTIONNAIRE, HOW DIFFICULT HAVE THESE PROBLEMS MADE IT FOR YOU TO DO YOUR WORK, TAKE CARE OF THINGS AT HOME, OR GET ALONG WITH OTHER PEOPLE: NOT DIFFICULT AT ALL
1. FEELING NERVOUS, ANXIOUS, OR ON EDGE: SEVERAL DAYS
GAD7 TOTAL SCORE: 2
GAD7 TOTAL SCORE: 2
5. BEING SO RESTLESS THAT IT IS HARD TO SIT STILL: NOT AT ALL
3. WORRYING TOO MUCH ABOUT DIFFERENT THINGS: NOT AT ALL
4. TROUBLE RELAXING: NOT AT ALL
7. FEELING AFRAID AS IF SOMETHING AWFUL MIGHT HAPPEN: NOT AT ALL
6. BECOMING EASILY ANNOYED OR IRRITABLE: SEVERAL DAYS

## 2022-12-02 ENCOUNTER — OFFICE VISIT (OUTPATIENT)
Dept: FAMILY MEDICINE | Facility: CLINIC | Age: 52
End: 2022-12-02
Payer: COMMERCIAL

## 2022-12-02 VITALS
HEART RATE: 86 BPM | BODY MASS INDEX: 30.85 KG/M2 | TEMPERATURE: 98.1 F | RESPIRATION RATE: 20 BRPM | OXYGEN SATURATION: 95 % | DIASTOLIC BLOOD PRESSURE: 88 MMHG | WEIGHT: 215 LBS | SYSTOLIC BLOOD PRESSURE: 126 MMHG

## 2022-12-02 DIAGNOSIS — F41.1 GAD (GENERALIZED ANXIETY DISORDER): ICD-10-CM

## 2022-12-02 DIAGNOSIS — F10.10 ALCOHOL ABUSE: Primary | ICD-10-CM

## 2022-12-02 LAB
ALBUMIN SERPL BCG-MCNC: 4.4 G/DL (ref 3.5–5.2)
ALP SERPL-CCNC: 73 U/L (ref 40–129)
ALT SERPL W P-5'-P-CCNC: 31 U/L (ref 10–50)
AST SERPL W P-5'-P-CCNC: 29 U/L (ref 10–50)
BILIRUB DIRECT SERPL-MCNC: <0.2 MG/DL (ref 0–0.3)
BILIRUB SERPL-MCNC: 0.3 MG/DL
PROT SERPL-MCNC: 7.2 G/DL (ref 6.4–8.3)

## 2022-12-02 PROCEDURE — 36415 COLL VENOUS BLD VENIPUNCTURE: CPT | Performed by: PHYSICIAN ASSISTANT

## 2022-12-02 PROCEDURE — 80076 HEPATIC FUNCTION PANEL: CPT | Performed by: PHYSICIAN ASSISTANT

## 2022-12-02 PROCEDURE — 99213 OFFICE O/P EST LOW 20 MIN: CPT | Performed by: PHYSICIAN ASSISTANT

## 2022-12-02 RX ORDER — HYDROXYZINE HYDROCHLORIDE 25 MG/1
25 TABLET, FILM COATED ORAL 3 TIMES DAILY PRN
Qty: 30 TABLET | Refills: 1 | Status: SHIPPED | OUTPATIENT
Start: 2022-12-02

## 2022-12-02 RX ORDER — GABAPENTIN 100 MG/1
CAPSULE ORAL
COMMUNITY
Start: 2022-11-23

## 2022-12-02 RX ORDER — ACAMPROSATE CALCIUM 333 MG/1
666 TABLET, DELAYED RELEASE ORAL 2 TIMES DAILY
COMMUNITY
Start: 2022-11-28

## 2022-12-02 RX ORDER — GABAPENTIN 300 MG/1
300 CAPSULE ORAL 3 TIMES DAILY
Qty: 90 CAPSULE | Refills: 1 | Status: SHIPPED | OUTPATIENT
Start: 2022-12-02 | End: 2022-12-28

## 2022-12-02 ASSESSMENT — ENCOUNTER SYMPTOMS
NUMBNESS: 1
PARESTHESIAS: 1
CONSTITUTIONAL NEGATIVE: 1
SLEEP DISTURBANCE: 0
NERVOUS/ANXIOUS: 1

## 2022-12-02 ASSESSMENT — ANXIETY QUESTIONNAIRES: GAD7 TOTAL SCORE: 2

## 2022-12-02 NOTE — PROGRESS NOTES
"  Assessment & Plan     Alcohol abuse  Follow-up with his primary as currently scheduled hepatic function panel was pending  - Hepatic function panel  - hydrOXYzine (ATARAX) 25 MG tablet  Dispense: 30 tablet; Refill: 1  - gabapentin (NEURONTIN) 300 MG capsule  Dispense: 90 capsule; Refill: 1  - Hepatic function panel    MAG (generalized anxiety disorder)  Added hydroxyzine follow-up if acutely worsening  - hydrOXYzine (ATARAX) 25 MG tablet  Dispense: 30 tablet; Refill: 1  - gabapentin (NEURONTIN) 300 MG capsule  Dispense: 90 capsule; Refill: 1               BMI:   Estimated body mass index is 30.85 kg/m  as calculated from the following:    Height as of 8/15/22: 1.778 m (5' 10\").    Weight as of this encounter: 97.5 kg (215 lb).           No follow-ups on file.    LON Goldsmith  [facility]    Trudy Valiente is a 52 year old, presenting for the following health issues:  Anxiety      52-year-old male presents to the clinic for anxiety he gets to the point where he almost has a panic attack  He has a history of alcohol abuse just finished inpatient treatment  He has been sober for about 3 weeks  He is doing outpatient counseling  He has no thoughts of self-harm there is no history of self-harm there is no thoughts of other harm no history of other harm he does have neuropathy he was taking thiamine and folic acid while in treatment they did not have him continue when he got out.  He is on Neurontin both for neuropathy and for anxiety he is also taking Campral for the cravings  He actually states he is sleeping well    Anxiety  Associated symptoms include numbness.   History of Present Illness       Mental Health Follow-up:  Patient presents to follow-up on Anxiety.    Patient's anxiety since last visit has been:  Medium  The patient is not having other symptoms associated with anxiety.  Any significant life events: other  Patient is feeling anxious or having panic attacks.  Patient " has concerns about alcohol or drug use.    He eats 0-1 servings of fruits and vegetables daily.He consumes 1 sweetened beverage(s) daily.He exercises with enough effort to increase his heart rate 9 or less minutes per day.  He exercises with enough effort to increase his heart rate 3 or less days per week.   He is taking medications regularly.  Today's MAG-7 Score: 2         Review of Systems   Constitutional: Negative.    Neurological: Positive for numbness and paresthesias.   Psychiatric/Behavioral: Negative for self-injury, sleep disturbance and suicidal ideas. The patient is nervous/anxious.             Objective    /88   Pulse 86   Temp 98.1  F (36.7  C)   Resp 20   Wt 97.5 kg (215 lb)   SpO2 95%   BMI 30.85 kg/m    Body mass index is 30.85 kg/m .  Physical Exam  Constitutional:       Appearance: Normal appearance.   Cardiovascular:      Rate and Rhythm: Normal rate.   Neurological:      Mental Status: He is alert.   Psychiatric:         Mood and Affect: Mood normal.         Behavior: Behavior normal.         Thought Content: Thought content normal.         Judgment: Judgment normal.

## 2022-12-28 DIAGNOSIS — F10.10 ALCOHOL ABUSE: ICD-10-CM

## 2022-12-28 DIAGNOSIS — F41.1 GAD (GENERALIZED ANXIETY DISORDER): ICD-10-CM

## 2022-12-28 RX ORDER — GABAPENTIN 300 MG/1
600 CAPSULE ORAL 3 TIMES DAILY
Qty: 180 CAPSULE | Refills: 0 | Status: SHIPPED | OUTPATIENT
Start: 2022-12-28

## 2023-03-26 ENCOUNTER — HEALTH MAINTENANCE LETTER (OUTPATIENT)
Age: 53
End: 2023-03-26

## 2024-06-01 ENCOUNTER — HEALTH MAINTENANCE LETTER (OUTPATIENT)
Age: 54
End: 2024-06-01

## 2025-06-14 ENCOUNTER — HEALTH MAINTENANCE LETTER (OUTPATIENT)
Age: 55
End: 2025-06-14